# Patient Record
(demographics unavailable — no encounter records)

---

## 2025-04-18 NOTE — HISTORY OF PRESENT ILLNESS
[de-identified] : First-time visit for this 78-year-old gentleman he is here with a 5-year history of bilateral knee pain right greater than left.  Patient states he has minimal pain at rest but has pain with walking especially going up and down stairs he notes the going downstairs more painful.  He recalls no specific accident injury initiating traumatic event.  Patient does have a significant history of 4 years ago suffered a pulmonary embolus was on blood thinner for about 3 months but no longer is taking the medication.  He is here for first-time evaluation of bilateral knee pain.

## 2025-04-18 NOTE — REASON FOR VISIT
[Initial Visit] : an initial visit for [Knee Pain] : knee pain [FreeTextEntry2] : bilateral knee pain for 5 yrs, feels achy and worse when walking. best when there is limited mtion.

## 2025-04-18 NOTE — DISCUSSION/SUMMARY
[de-identified] : Patient I discussed the probable underlying etiology of his bilateral knee pain.  Patient has clinical exam history suggestive of fairly advanced osteoarthritis of both knees.  Patient be sent for radiographs later today we will review the findings to confirm the diagnosis.  As a temporizing measure patient I talked about her options we will advised him to liberally ice the knees between dinner and bedtime for at least a week 10 to 15 minutes he will also be placed on Celebrex 200 mg p.o. twice daily for 6-day course then to be taken thereafter as needed.  The reasonable risk and benefits of medication were discussed in detail including potential side effects.  We also reviewed the patient's current medication profile appears to be no relative current contraindication to his limited use.  We talked about the option of utilizing both steroid injections possible HA injections depending on the findings of the radiographs we also talked about the potential need to consider knee replacement surgery if the patient has severe radiographic findings and no improvement with these conservative measures.  Patient follow-up in 10 days to 2 weeks time if not thoroughly improved with his current regimen we may consider cortisone injection on the next visit or possible schedule knee replacement surgery depending on our findings.  This consultation lasted 45 minutes exclusive teaching time and any separately billed procedures.

## 2025-05-02 NOTE — PHYSICAL EXAM
[de-identified] : Right knee has global warmth soft tissue swelling small effusion mild varus and collation range of motion 3 to 125 degrees.  The knee is stable to stress varus valgus stress in both extension and 90 degrees flexion.  Similar findings to the left knee but to a lesser degree.  Less global warmth swelling no effusion noted definite medial and lateral joint line tenderness range of motion 0 to 130 degrees.  Knee stable to stress varus valgus stress in both full extension and 90 degrees of flexion. [de-identified] : Radiograph results as stated above in history.

## 2025-05-02 NOTE — DISCUSSION/SUMMARY
[de-identified] : Patient I reviewed the radiographs directly together he was able to appreciate the lack of cartilage in the medial compartment both knees.  We talked at length about her options patient defers on cortisone injections today he will continue to take the Celebrex as needed he will also continue to ice the knees when symptomatic at night.  We will order bilateral single-dose HA injections and notify the patient once they are available for use.  We also talked at length about the need to potentially consider knee replacement surgery the patientsustained symptomatic improvement with all these conservative measures that we do today.  This consultation lasted 30 minutes exclusive teaching time and any separately billed procedures.

## 2025-05-02 NOTE — HISTORY OF PRESENT ILLNESS
[de-identified] : Patient returns today with radiographs recently performed of both knees recall he was complaining of bilateral knee pain.  Patient is having increasing discomfort going up and down steps and getting out of a seated position he states he feels significantly improved with a trial of Celebrex that he was prescribed on the initial visit.  Radiographs indicate near complete cartilage loss of the medial compartment of both knees with early secondary findings of osteophyte cyst formation and mild varus inclination.  The patellofemoral articulations on the sunrise projection are essentially indicating well-preserved cartilage.

## 2025-05-02 NOTE — REASON FOR VISIT
[Follow-Up Visit] : a follow-up visit for [Knee Pain] : knee pain [FreeTextEntry2] : CHRONIC BILATERAL KNEE PAIN. REVIEW XRAYS

## 2025-05-27 NOTE — REASON FOR VISIT
[Follow-Up Visit] : a follow-up visit for [Knee Pain] : knee pain [FreeTextEntry2] : LEFT KNEE MONOVISC GEL INJECTION

## 2025-05-27 NOTE — DISCUSSION/SUMMARY
[de-identified] : Patient I talked at length about further conservative management to help with his left knee pain due to advanced knee osteoarthritis.  As stated above patient is keen to avoid knee replacement surgery at this point.  In addition to today's HA injection patient can return for intermittent cortisone injections every 4 to 6 months as needed.  Patient also told to liberally ice the knee when symptoms merit and to take over-the-counter anti-inflammatories and/or prescription of Celebrex that was given to him on the previous visit.  Talked at length about the need to potentially consider knee replacement surgery patient not seeing sustained symptomatic improvement with these conservative measures.  Additionally we reviewed the fact the patient's current calculated BMI of 26.85 kg/m is placed in the diagnostic category of overweight.  We talked about engaging weight/BMI reduction protocol to help reduce his current symptoms also include efficacy of today's injections.  Furthermore weight/BMI reduction in the setting of knee replacement surgery should have a beneficial effect by reducing perioperative complications and potentially improving implant longevity.  This consultation regarding patient diagnosis of overweight in setting of knee osteoarthritis which is advanced and the potential need to consider knee replacement surgery less than 30 minutes exclusive teaching time and separately billed procedures.

## 2025-05-27 NOTE — PHYSICAL EXAM
[de-identified] : Patient's most recent calculated BMI of today is 26.85 kg/m.  Similar findings to the left knee but to a lesser degree.  Less global warmth swelling no effusion noted definite medial and lateral joint line tenderness range of motion 0 to 130 degrees.  Knee stable to stress varus valgus stress in both full extension and 90 degrees of flexion.

## 2025-05-27 NOTE — PROCEDURE
[de-identified] : MONOVISC [employer] NDC [address]-66 LOT # [id number] EXP 10/31/2027 4ML/22MG  Patient given left knee Monovisc injection today was done in sterile conditions to the lateral compartment of the knee.  Patient tolerated injection well.

## 2025-05-27 NOTE — HISTORY OF PRESENT ILLNESS
[de-identified] : Patient returns today discussed further conservative measures managing operatives left knee pain due to advanced knee osteoarthritis.  Patient need to avoid knee replacement surgery.  He is also here for single-dose HA injection left-sided.